# Patient Record
Sex: MALE | Race: WHITE | NOT HISPANIC OR LATINO
[De-identification: names, ages, dates, MRNs, and addresses within clinical notes are randomized per-mention and may not be internally consistent; named-entity substitution may affect disease eponyms.]

---

## 2022-11-13 ENCOUNTER — FORM ENCOUNTER (OUTPATIENT)
Age: 47
End: 2022-11-13

## 2022-11-14 ENCOUNTER — APPOINTMENT (OUTPATIENT)
Dept: ORTHOPEDIC SURGERY | Facility: CLINIC | Age: 47
End: 2022-11-14

## 2022-11-14 ENCOUNTER — APPOINTMENT (OUTPATIENT)
Dept: MRI IMAGING | Facility: CLINIC | Age: 47
End: 2022-11-14

## 2022-11-14 VITALS — BODY MASS INDEX: 35.21 KG/M2 | HEIGHT: 72 IN | WEIGHT: 260 LBS

## 2022-11-14 DIAGNOSIS — Z78.9 OTHER SPECIFIED HEALTH STATUS: ICD-10-CM

## 2022-11-14 PROCEDURE — 72148 MRI LUMBAR SPINE W/O DYE: CPT

## 2022-11-14 PROCEDURE — 72100 X-RAY EXAM L-S SPINE 2/3 VWS: CPT

## 2022-11-14 PROCEDURE — 99203 OFFICE O/P NEW LOW 30 MIN: CPT

## 2022-11-14 RX ORDER — METHYLPREDNISOLONE 4 MG/1
4 TABLET ORAL
Qty: 1 | Refills: 0 | Status: ACTIVE | COMMUNITY
Start: 2022-11-14 | End: 1900-01-01

## 2022-11-14 RX ORDER — TRAMADOL HYDROCHLORIDE 50 MG/1
50 TABLET, COATED ORAL 3 TIMES DAILY
Qty: 21 | Refills: 0 | Status: ACTIVE | COMMUNITY
Start: 2022-11-14 | End: 1900-01-01

## 2022-11-14 RX ORDER — CYCLOBENZAPRINE HYDROCHLORIDE 10 MG/1
10 TABLET, FILM COATED ORAL 3 TIMES DAILY
Qty: 60 | Refills: 2 | Status: ACTIVE | COMMUNITY
Start: 2022-11-14 | End: 1900-01-01

## 2022-11-15 ENCOUNTER — APPOINTMENT (OUTPATIENT)
Dept: ORTHOPEDIC SURGERY | Facility: CLINIC | Age: 47
End: 2022-11-15

## 2022-11-15 VITALS — WEIGHT: 260 LBS | HEIGHT: 72 IN | BODY MASS INDEX: 35.21 KG/M2

## 2022-11-15 PROCEDURE — 99213 OFFICE O/P EST LOW 20 MIN: CPT

## 2022-11-15 NOTE — DISCUSSION/SUMMARY
[de-identified] : MRI reviewed, has extruded disc impinging on nerve root on right. Will continue MDP, and see Dr. Koch next week, may need lumbar discectomy or LESIs

## 2022-11-15 NOTE — PHYSICAL EXAM
[Right lower extremity below knee] : right lower extremity below knee [Absent] : patella reflex absent [Straightening consistent with spasm] : Straightening consistent with spasm [Disc space narrowing] : Disc space narrowing [Right] : right hip with pelvis [AP] : anteroposterior [Lateral] : lateral [There are no fractures, subluxations or dislocations. No significant abnormalities are seen] : There are no fractures, subluxations or dislocations. No significant abnormalities are seen [de-identified] : medial shin [FreeTextEntry1] : Mild narrowing L3/4 [] : uses wheelchair [FreeTextEntry9] : has full range of motion with pain

## 2022-11-15 NOTE — HISTORY OF PRESENT ILLNESS
[Lower back] : lower back [7] : 7 [Dull/Aching] : dull/aching [Radiating] : radiating [Sharp] : sharp [Household chores] : household chores [Work] : work [Sleep] : sleep [de-identified] : Started with right hip pain, was wearing a hip brace which seemed to help, then pain worsened into hip, down thigh, numb right inner shin, pain around buttock to right low back. Went to ER, given Flexeril, using Aleve for NSAID. No left sided symptoms, no bowel or bladder changes [] : no [FreeTextEntry1] : right hip  [FreeTextEntry3] : 3-4 weeks  [FreeTextEntry5] : patient believes that after limping because of his plantar fascitis, he has been feeling even worse pain in the hip  [FreeTextEntry6] : numbness  [FreeTextEntry7] : from the buttocks to the shin [de-identified] : xrays

## 2022-11-15 NOTE — HISTORY OF PRESENT ILLNESS
[7] : 7 [Dull/Aching] : dull/aching [Radiating] : radiating [Sharp] : sharp [Household chores] : household chores [Work] : work [Sleep] : sleep [de-identified] : Started with right hip pain, was wearing a hip brace which seemed to help, then pain worsened into hip, down thigh, numb right inner shin, pain around buttock to right low back. Went to ER, given Flexeril, using Aleve for NSAID. No left sided symptoms, no bowel or bladder changes [] : no [FreeTextEntry1] : right hip  [FreeTextEntry3] : 3-4 weeks  [FreeTextEntry5] : patient believes that after limping because of his plantar fascitis, he has been feeling even worse pain in the hip  [FreeTextEntry6] : numbness  [FreeTextEntry7] : from the buttocks to the shin [de-identified] : xrays

## 2022-11-15 NOTE — PHYSICAL EXAM
[Right lower extremity below knee] : right lower extremity below knee [Absent] : patella reflex absent [Straightening consistent with spasm] : Straightening consistent with spasm [Disc space narrowing] : Disc space narrowing [Right] : right hip with pelvis [AP] : anteroposterior [Lateral] : lateral [There are no fractures, subluxations or dislocations. No significant abnormalities are seen] : There are no fractures, subluxations or dislocations. No significant abnormalities are seen [] : no swelling [de-identified] : medial shin [FreeTextEntry1] : Mild narrowing L3/4 [FreeTextEntry9] : has full range of motion with pain

## 2022-11-15 NOTE — REASON FOR VISIT
[Spouse] : spouse [FreeTextEntry2] : 11/15/22- Had MRI: Impression: Mild multilevel lumbar degenerative disc disease.\par Small bulge at L2-3 without stenosis or nerve root compression.\par Extruded disc fragment originating from the L3-4 disc migrating inferiorly to the right of midline behind the \par body of L4 to the level of the pedicle with significant compression of the right L4 nerve root.\par Right foraminal disc herniation at L4-5 with mild compression of the right L4 nerve root.\par Central disc herniation at L5-S1 without stenosis or nerve root compression.

## 2022-11-23 ENCOUNTER — TRANSCRIPTION ENCOUNTER (OUTPATIENT)
Age: 47
End: 2022-11-23

## 2022-11-23 ENCOUNTER — APPOINTMENT (OUTPATIENT)
Dept: ORTHOPEDIC SURGERY | Facility: CLINIC | Age: 47
End: 2022-11-23

## 2022-11-23 VITALS — HEIGHT: 72 IN | WEIGHT: 260 LBS | BODY MASS INDEX: 35.21 KG/M2

## 2022-11-23 PROCEDURE — 99214 OFFICE O/P EST MOD 30 MIN: CPT

## 2022-11-23 RX ORDER — CYCLOBENZAPRINE HYDROCHLORIDE 5 MG/1
5 TABLET, FILM COATED ORAL 3 TIMES DAILY
Qty: 60 | Refills: 0 | Status: ACTIVE | COMMUNITY
Start: 2022-11-23 | End: 1900-01-01

## 2022-11-23 NOTE — HISTORY OF PRESENT ILLNESS
[Lower back] : lower back [7] : 7 [Dull/Aching] : dull/aching [Sharp] : sharp [Household chores] : household chores [Work] : work [Sleep] : sleep [de-identified] : 47 M with RLE radiculopathy.  Seen by Miles who prescribed MDP.  Since mDP pain has improved Numbness in tingling has improved.  Initially had numbness and tingling in right anterior thigh and calf.  Now only in calf. Has not done any PT.  No bowel o bladder symptoms.  No weakness. NO LLE symptoms.  [] : no [FreeTextEntry1] : lower back  [FreeTextEntry3] : 3-4 weeks  [FreeTextEntry5] : pt is here for pain in his lower back, he has seen , and has done MRI.  [FreeTextEntry6] : numbness  [de-identified] : MRI

## 2022-11-23 NOTE — IMAGING
[de-identified] : Spine:\par Inspection/Palpation:\par No tenderness to palpation throughout Cervical/thoracic/lumbar spine.\par No bony stepoffs, No lesions.\par \par Gait:\par Non-antalgic, able to perform bilateral toe and heel rise.  Able to perform tandem gait.  \par \par Range of Motion:\par Lumbar Spine: Flexion to 90 degrees, Extension to 30 degrees, rotation 30 degrees bilaterally, lateral flexion to 30 degrees bilaterally.\par \par Neurologic:\par \par Bilateral Lower Extremities 5/5 Iliopsoas/Quadriceps/Hamstrings/ Tibialis Anterior/ Gastrocnemius. Extensor Hallucis Longus/ Flexor Hallucis Longus except \par \par \par Sensation intact to light touch L2-S1\par \par Patellar/ Achilles reflex within normal limits.\par \par \par Negative straight leg raise\par \par No pain with IR/ER/Flexion of HIps bilaterally \par \par MRI Lumbar spine wirht Large R L4-5 HNP causing foraminal stneosis. \par

## 2022-11-29 ENCOUNTER — APPOINTMENT (OUTPATIENT)
Dept: PAIN MANAGEMENT | Facility: CLINIC | Age: 47
End: 2022-11-29

## 2022-11-29 VITALS — WEIGHT: 260 LBS | HEIGHT: 72 IN | BODY MASS INDEX: 35.21 KG/M2

## 2022-11-29 PROCEDURE — 99204 OFFICE O/P NEW MOD 45 MIN: CPT

## 2022-11-29 NOTE — DISCUSSION/SUMMARY
[de-identified] : After discussing various treatment options with the patient including but not limited to oral medications, physical therapy, exercise modalities as well as interventional spinal injections, we have decided with the following plan:\par \par - Continue Home exercises, stretching, activity modification, physical therapy, and conservative care.\par - MRI report and/or images was reviewed and discussed with the patient.\par - Recommend Right L3-4, L4-5 Transforaminal Epidural Steroid Injection under fluoroscopic guidance with image.\par - The risks, benefits and alternatives of the proposed procedure were explained in detail with the patient. The risks outlined include but are not limited to infection, bleeding, post-dural puncture headache, nerve injury, a temporary increase in pain, failure to resolve symptoms, allergic reaction, symptom recurrence, and possible elevation of blood sugar in diabetics. All questions were answered to patient's apparent satisfaction and he/she verbalized an understanding.\par - Patient is presenting with acute/sub-acute radicular pain with impairment in ADLs and functionality.  The pain has not responded to conservative care including NSAID therapy and/or physical therapy.  There is no bleeding tendency, unstable medical condition, or systemic infection.\par - Follow up in 1-2 weeks post injection for re-evaluation.\par - Will call to schedule.\par

## 2022-11-29 NOTE — PHYSICAL EXAM
[de-identified] : Constitutional; Appears well, no apparent distress\par Ability to communicate: Normal \par Respiratory: non-labored breathing\par Skin: No rash noted\par Head: Normocephalic, atraumatic\par Neck: no visible thyroid enlargement\par Eyes: Extraocular movements intact\par Neurologic: Alert and oriented x3\par Psychiatric: normal mood, affect and behavior \par \par  [] : motor exam is 5/5 throughout both lower extremities with normal tone

## 2022-11-29 NOTE — HISTORY OF PRESENT ILLNESS
[Lower back] : lower back [Radiating] : radiating [Constant] : constant [Sleep] : sleep [Lying in bed] : lying in bed [de-identified] : Initial HPI 11/29/2022:\par Pain started 2-3 months ago and is in the right hip/groin and has associated numbness down the right inner thigh and lower leg. Saw Dr. Guzman Koch who recommended ZOË and if doesn’t help L4-5 microdiscectomy. \par \par MRI L-spine 11/14/22 independently reviewed: right L4-5 HNP \par Physical Therapy: Has started PT \par Pain Medications: Completed MDP with good relief, flexeril, Mobic, Percocet from the ER \par Past Injections: none\par Spine surgery: none \par Blood thinners: none  [] : no [FreeTextEntry6] : numbness [FreeTextEntry7] : right leg

## 2023-01-04 ENCOUNTER — APPOINTMENT (OUTPATIENT)
Dept: ORTHOPEDIC SURGERY | Facility: CLINIC | Age: 48
End: 2023-01-04
Payer: COMMERCIAL

## 2023-01-04 VITALS — HEIGHT: 72 IN | WEIGHT: 260 LBS | BODY MASS INDEX: 35.21 KG/M2

## 2023-01-04 DIAGNOSIS — M79.10 MYALGIA, UNSPECIFIED SITE: ICD-10-CM

## 2023-01-04 PROCEDURE — 99213 OFFICE O/P EST LOW 20 MIN: CPT

## 2023-01-04 NOTE — ASSESSMENT
[FreeTextEntry1] : 47 M with R L4 radic and large L4-5 HNP. \par Improving with PT\par Continued PT allows for ability to continue  with conservative management\par Days without PT increase pain in disability\par  FU PRN \par

## 2023-01-04 NOTE — HISTORY OF PRESENT ILLNESS
[Lower back] : lower back [7] : 7 [Dull/Aching] : dull/aching [Sharp] : sharp [Household chores] : household chores [Work] : work [Sleep] : sleep [de-identified] : 47 M with RLE radiculopathy.  Seen by Miles who prescribed MDP.  Since MDP pain has improved Numbness in tingling has improved.  Initially had numbness and tingling in right anterior thigh and calf.  Now only in calf. Has not done any PT.  No bowel o bladder symptoms.  No weakness. NO LLE symptoms. \par \par 1/4/23: patient states PT and medication have been giving him great improvement. reports numbness in right leg and mild right hip pain. Pain is improved with PT when it comes up.  Has been able to avoid ZOË due to PT. When not goign to PT for several days has increased pain.    Has been back at work since 12/12/22.  Still has persistent numbness in leg.   [] : no [FreeTextEntry1] : lower back  [FreeTextEntry3] : 3-4 weeks  [FreeTextEntry5] : pt is here for pain in his lower back, he has seen , and has done MRI.  [FreeTextEntry6] : numbness  [de-identified] : MRI

## 2023-01-04 NOTE — IMAGING
[de-identified] : Spine:\par Inspection/Palpation:\par No tenderness to palpation throughout Cervical/thoracic/lumbar spine.\par No bony stepoffs, No lesions.\par \par Gait:\par , able to perform bilateral toe and heel rise.  Able to perform tandem gait.  \par \par \par \par Neurologic:\par \par Bilateral Lower Extremities 5/5 Iliopsoas/Quadriceps/Hamstrings/ Tibialis Anterior/ Gastrocnemius. Extensor Hallucis Longus/ Flexor Hallucis Longus except \par \par \par Sensation intact to light touch L2-S1\par \par Patellar/ Achilles reflex within normal limits.\par \par \par Negative straight leg raise\par \par No pain with IR/ER/Flexion of HIps bilaterally \par \par MRI Lumbar spine wirht Large R L4-5 HNP causing foraminal stneosis. \par

## 2023-01-20 ENCOUNTER — RX RENEWAL (OUTPATIENT)
Age: 48
End: 2023-01-20

## 2023-03-01 ENCOUNTER — APPOINTMENT (OUTPATIENT)
Dept: ORTHOPEDIC SURGERY | Facility: CLINIC | Age: 48
End: 2023-03-01
Payer: COMMERCIAL

## 2023-03-01 VITALS — HEIGHT: 72 IN | BODY MASS INDEX: 36.57 KG/M2 | WEIGHT: 270 LBS

## 2023-03-01 PROCEDURE — 99213 OFFICE O/P EST LOW 20 MIN: CPT

## 2023-03-01 NOTE — IMAGING
[de-identified] : Spine:\par Inspection/Palpation:\par No tenderness to palpation throughout Cervical/thoracic/lumbar spine.\par No bony stepoffs, No lesions.\par \par Gait:\par , able to perform bilateral toe and heel rise.  Able to perform tandem gait.  \par \par \par \par Neurologic:\par \par Bilateral Lower Extremities 5/5 Iliopsoas/Quadriceps/Hamstrings/ Tibialis Anterior/ Gastrocnemius. Extensor Hallucis Longus/ Flexor Hallucis Longus except \par \par \par Sensation intact to light touch L2-S1\par \par Patellar/ Achilles reflex within normal limits.\par \par \par Negative straight leg raise\par \par No pain with IR/ER/Flexion of HIps bilaterally \par \par MRI Lumbar spine wirht Large R L4-5 HNP causing foraminal stneosis. \par

## 2023-03-01 NOTE — ASSESSMENT
[FreeTextEntry1] : 47 M with R L4 radic and large L4-5 HNP. \par Plateaued with PT\par COnsider ZOË with pain management.FU 6 weeks\par  \par If injection fails consider R l4-5 microdisc

## 2023-03-01 NOTE — HISTORY OF PRESENT ILLNESS
[Lower back] : lower back [1] : 2 [0] : 0 [Dull/Aching] : dull/aching [Sharp] : sharp [Household chores] : household chores [Work] : work [Sleep] : sleep [Full time] : Work status: full time [de-identified] : 47 M with RLE radiculopathy.  Seen by Miles who prescribed MDP.  Since MDP pain has improved Numbness in tingling has improved.  Initially had numbness and tingling in right anterior thigh and calf.  Now only in calf. Has not done any PT.  No bowel o bladder symptoms.  No weakness. NO LLE symptoms. \par \par 1/4/23: patient states PT and medication have been giving him great improvement. reports numbness in right leg and mild right hip pain. Pain is improved with PT when it comes up.  Has been able to avoid ZOË due to PT. When not goign to PT for several days has increased pain.    Has been back at work since 12/12/22.  Still has persistent numbness in leg.  \par \par 3/1/23: pt is here for follow up visit today. He reports N/T and radiating pain in bilateral legs since he was last seen.  tingling in right anterior thigh is more common.   [] : no [FreeTextEntry1] : lower back  [FreeTextEntry3] : 3-4 weeks  [FreeTextEntry5] : pt is here for pain in his lower back, he has seen , and has done MRI.  [FreeTextEntry6] : numbness  [FreeTextEntry7] : bilateral legs [de-identified] : MRI [de-identified] : [employer]-

## 2023-03-06 ENCOUNTER — APPOINTMENT (OUTPATIENT)
Dept: PAIN MANAGEMENT | Facility: CLINIC | Age: 48
End: 2023-03-06
Payer: COMMERCIAL

## 2023-03-06 VITALS — HEIGHT: 72 IN | WEIGHT: 270 LBS | BODY MASS INDEX: 36.57 KG/M2

## 2023-03-06 PROCEDURE — 99214 OFFICE O/P EST MOD 30 MIN: CPT

## 2023-03-06 NOTE — PHYSICAL EXAM
[de-identified] : Constitutional; Appears well, no apparent distress\par Ability to communicate: Normal \par Respiratory: non-labored breathing\par Skin: No rash noted\par Head: Normocephalic, atraumatic\par Neck: no visible thyroid enlargement\par Eyes: Extraocular movements intact\par Neurologic: Alert and oriented x3\par Psychiatric: normal mood, affect and behavior \par \par  [] : no lumbar paraspinal tenderness

## 2023-03-06 NOTE — HISTORY OF PRESENT ILLNESS
[Lower back] : lower back [Radiating] : radiating [Constant] : constant [Sleep] : sleep [Standing] : standing [FreeTextEntry1] : 3/6/23: Was recommended to have Right L3-4, L4-5 TFESI but wanted to wait. Has been doing PT. Has numbness down the right lateral thigh and lower leg to the ankle. Inner thigh numbness is improved. \par \par Initial HPI 11/29/2022:\par Pain started 2-3 months ago and is in the right hip/groin and has associated numbness down the right inner thigh and lower leg. Saw Dr. Guzman Koch who recommended ZOË and if doesn’t help L4-5 microdiscectomy. \par \par MRI L-spine 11/14/22 independently reviewed: right L4-5 HNP \par Physical Therapy: Has started PT \par Pain Medications: Completed MDP with good relief, flexeril, Mobic, Percocet from the ER \par Past Injections: none\par Spine surgery: none \par Blood thinners: none  [] : no [FreeTextEntry6] : numbness [FreeTextEntry7] : right leg to the foot. smetimes left leg  [de-identified] : Initial HPI 11/29/2022:\par Pain started 2-3 months ago and is in the right hip/groin and has associated numbness down the right inner thigh and lower leg. Saw Dr. Guzman Koch who recommended ZOË and if doesn’t help L4-5 microdiscectomy. \par \par MRI L-spine 11/14/22 independently reviewed: right L4-5 HNP \par Physical Therapy: Has started PT \par Pain Medications: Completed MDP with good relief, flexeril, Mobic, Percocet from the ER \par Past Injections: none\par Spine surgery: none \par Blood thinners: none

## 2023-03-06 NOTE — DISCUSSION/SUMMARY
[de-identified] : After discussing various treatment options with the patient including but not limited to oral medications, physical therapy, exercise modalities as well as interventional spinal injections, we have decided with the following plan:\par \par - Continue Home exercises, stretching, activity modification, physical therapy, and conservative care.\par - MRI report and/or images was reviewed and discussed with the patient.\par - Recommend Right L3-4, L4-5 Transforaminal Epidural Steroid Injection under fluoroscopic guidance with image.\par - The risks, benefits and alternatives of the proposed procedure were explained in detail with the patient. The risks outlined include but are not limited to infection, bleeding, post-dural puncture headache, nerve injury, a temporary increase in pain, failure to resolve symptoms, allergic reaction, symptom recurrence, and possible elevation of blood sugar in diabetics. All questions were answered to patient's apparent satisfaction and he/she verbalized an understanding.\par - Patient is presenting with acute/sub-acute radicular pain with impairment in ADLs and functionality.  The pain has not responded to conservative care including NSAID therapy and/or physical therapy.  There is no bleeding tendency, unstable medical condition, or systemic infection.\par - Follow up in 1-2 weeks post injection for re-evaluation.

## 2023-03-15 ENCOUNTER — APPOINTMENT (OUTPATIENT)
Age: 48
End: 2023-03-15
Payer: COMMERCIAL

## 2023-03-15 PROCEDURE — 64483 NJX AA&/STRD TFRM EPI L/S 1: CPT | Mod: RT

## 2023-03-15 PROCEDURE — 64484 NJX AA&/STRD TFRM EPI L/S EA: CPT | Mod: 59,RT

## 2023-03-27 ENCOUNTER — APPOINTMENT (OUTPATIENT)
Dept: PAIN MANAGEMENT | Facility: CLINIC | Age: 48
End: 2023-03-27
Payer: COMMERCIAL

## 2023-03-27 VITALS — BODY MASS INDEX: 35.21 KG/M2 | WEIGHT: 260 LBS | HEIGHT: 72 IN

## 2023-03-27 PROCEDURE — 99214 OFFICE O/P EST MOD 30 MIN: CPT

## 2023-03-27 NOTE — DISCUSSION/SUMMARY
[de-identified] : After discussing various treatment options with the patient including but not limited to oral medications, physical therapy, exercise modalities as well as interventional spinal injections, we have decided with the following plan:\par \par - Continue Home exercises, stretching, activity modification, physical therapy, and conservative care.\par - MRI report and/or images was reviewed and discussed with the patient.\par - Recommend L3-4 Lumbar Epidural Steroid Injection under fluoroscopic guidance with image. (RIGHT) \par - The risks, benefits and alternatives of the proposed procedure were explained in detail with the patient. The risks outlined include but are not limited to infection, bleeding, post-dural puncture headache, nerve injury, a temporary increase in pain, failure to resolve symptoms, allergic reaction, symptom recurrence, and possible elevation of blood sugar in diabetics. All questions were answered to patient's apparent satisfaction and he/she verbalized an understanding.\par - Patient is presenting with acute/sub-acute radicular pain with impairment in ADLs and functionality.  The pain has not responded to conservative care including NSAID therapy and/or physical therapy.  There is no bleeding tendency, unstable medical condition, or systemic infection.\par - Follow up in 1-2 weeks post injection for re-evaluation.\par

## 2023-03-27 NOTE — HISTORY OF PRESENT ILLNESS
[Lower back] : lower back [Radiating] : radiating [Constant] : constant [Sleep] : sleep [Standing] : standing [de-identified] : Initial HPI 11/29/2022:\par Pain started 2-3 months ago and is in the right hip/groin and has associated numbness down the right inner thigh and lower leg. Saw Dr. Guzman Koch who recommended ZOË and if doesn’t help L4-5 microdiscectomy. \par \par MRI L-spine 11/14/22 independently reviewed: right L4-5 HNP \par Physical Therapy: Has started PT \par Pain Medications: Completed MDP with good relief, flexeril, Mobic, Percocet from the ER \par Past Injections: none\par Spine surgery: none \par Blood thinners: none  [FreeTextEntry1] : 03/27/2023: s/p right L3-4 L4-5 TFESI on  03/15/23 with little relief. Pain no longer radiates down the leg to the ankle but still radiates to the knee. \par \par 3/6/23: Was recommended to have Right L3-4, L4-5 TFESI but wanted to wait. Has been doing PT. Has numbness down the right lateral thigh and lower leg to the ankle. Inner thigh numbness is improved. \par \par Initial HPI 11/29/2022:\par Pain started 2-3 months ago and is in the right hip/groin and has associated numbness down the right inner thigh and lower leg. Saw Dr. Guzman Koch who recommended ZOË and if doesn’t help L4-5 microdiscectomy. \par \par MRI L-spine 11/14/22 independently reviewed: right L4-5 HNP \par Physical Therapy: Has started PT \par Pain Medications: Completed MDP with good relief, flexeril, Mobic, Percocet from the ER \par Past Injections: none\par Spine surgery: none \par Blood thinners: none  [] : no [FreeTextEntry6] : numbness [FreeTextEntry7] : right leg to the foot. smetimes left leg  [TWNoteComboBox1] : 50%

## 2023-03-27 NOTE — PHYSICAL EXAM
[de-identified] : Constitutional; Appears well, no apparent distress\par Ability to communicate: Normal \par Respiratory: non-labored breathing\par Skin: No rash noted\par Head: Normocephalic, atraumatic\par Neck: no visible thyroid enlargement\par Eyes: Extraocular movements intact\par Neurologic: Alert and oriented x3\par Psychiatric: normal mood, affect and behavior \par \par  [] : no lumbar paraspinal tenderness

## 2023-04-05 ENCOUNTER — APPOINTMENT (OUTPATIENT)
Dept: ORTHOPEDIC SURGERY | Facility: CLINIC | Age: 48
End: 2023-04-05
Payer: COMMERCIAL

## 2023-04-05 VITALS — HEIGHT: 72 IN | BODY MASS INDEX: 35.21 KG/M2 | WEIGHT: 260 LBS

## 2023-04-05 DIAGNOSIS — M54.16 RADICULOPATHY, LUMBAR REGION: ICD-10-CM

## 2023-04-05 DIAGNOSIS — M51.26 OTHER INTERVERTEBRAL DISC DISPLACEMENT, LUMBAR REGION: ICD-10-CM

## 2023-04-05 PROCEDURE — 99213 OFFICE O/P EST LOW 20 MIN: CPT

## 2023-04-05 RX ORDER — MELOXICAM 15 MG/1
15 TABLET ORAL
Qty: 30 | Refills: 0 | Status: ACTIVE | COMMUNITY
Start: 2022-11-23 | End: 1900-01-01

## 2023-04-05 NOTE — IMAGING
[de-identified] : Spine:\par Inspection/Palpation:\par No tenderness to palpation throughout Cervical/thoracic/lumbar spine.\par No bony stepoffs, No lesions.\par \par Gait:\par , able to perform bilateral toe and heel rise.  Able to perform tandem gait.  \par \par \par \par Neurologic:\par \par Bilateral Lower Extremities 5/5 Iliopsoas/Quadriceps/Hamstrings/ Tibialis Anterior/ Gastrocnemius. Extensor Hallucis Longus/ Flexor Hallucis Longus except \par \par \par Sensation intact to light touch L2-S1\par \par Patellar/ Achilles reflex within normal limits.\par \par \par Negative straight leg raise\par \par No pain with IR/ER/Flexion of HIps bilaterally \par \par MRI Lumbar spine wirht Large R L4-5 HNP causing foraminal stneosis. \par

## 2023-04-05 NOTE — ASSESSMENT
[FreeTextEntry1] : 47 M with R L4 radic and large L4-5 HNP. Improved after ZOË \par Continue with PT\par Continue with ZOË\par BEgin to wean meloxicam\par FU 2 months

## 2023-04-05 NOTE — HISTORY OF PRESENT ILLNESS
[Lower back] : lower back [3] : 3 [0] : 0 [Dull/Aching] : dull/aching [Sharp] : sharp [Household chores] : household chores [Work] : work [Sleep] : sleep [Full time] : Work status: full time [de-identified] : 47 M with RLE radiculopathy.  Seen by Miles who prescribed MDP.  Since MDP pain has improved Numbness in tingling has improved.  Initially had numbness and tingling in right anterior thigh and calf.  Now only in calf. Has not done any PT.  No bowel o bladder symptoms.  No weakness. NO LLE symptoms. \par \par 1/4/23: patient states PT and medication have been giving him great improvement. reports numbness in right leg and mild right hip pain. Pain is improved with PT when it comes up.  Has been able to avoid ZOË due to PT. When not goign to PT for several days has increased pain.    Has been back at work since 12/12/22.  Still has persistent numbness in leg.  \par \par 3/1/23: pt is here for follow up visit today. He reports N/T and radiating pain in bilateral legs since he was last seen.  tingling in right anterior thigh is more common.  \par \par 4/5/23: pt is here for follow up visit. had LTFESI on 3/15/23. reports numbness has subsided since injection. PT 2x/wk with relief, has not been since injection. would like to discuss meloxicam refill.  [] : no [FreeTextEntry6] : numbness  [FreeTextEntry7] : bilateral legs [de-identified] : MRI [de-identified] : LTFESI [de-identified] : [employer]-

## 2023-05-03 ENCOUNTER — APPOINTMENT (OUTPATIENT)
Age: 48
End: 2023-05-03

## 2023-05-17 ENCOUNTER — APPOINTMENT (OUTPATIENT)
Age: 48
End: 2023-05-17
Payer: COMMERCIAL

## 2023-05-17 PROCEDURE — 62323 NJX INTERLAMINAR LMBR/SAC: CPT

## 2023-05-31 ENCOUNTER — APPOINTMENT (OUTPATIENT)
Dept: ORTHOPEDIC SURGERY | Facility: CLINIC | Age: 48
End: 2023-05-31

## 2023-06-12 ENCOUNTER — APPOINTMENT (OUTPATIENT)
Dept: PAIN MANAGEMENT | Facility: CLINIC | Age: 48
End: 2023-06-12

## 2023-06-23 ENCOUNTER — APPOINTMENT (OUTPATIENT)
Dept: PAIN MANAGEMENT | Facility: CLINIC | Age: 48
End: 2023-06-23
Payer: COMMERCIAL

## 2023-06-23 VITALS — WEIGHT: 260 LBS | BODY MASS INDEX: 35.21 KG/M2 | HEIGHT: 72 IN

## 2023-06-23 DIAGNOSIS — M54.17 RADICULOPATHY, LUMBOSACRAL REGION: ICD-10-CM

## 2023-06-23 DIAGNOSIS — M54.50 LOW BACK PAIN, UNSPECIFIED: ICD-10-CM

## 2023-06-23 PROCEDURE — 99214 OFFICE O/P EST MOD 30 MIN: CPT

## 2023-06-23 NOTE — HISTORY OF PRESENT ILLNESS
[Lower back] : lower back [Radiating] : radiating [Sleep] : sleep [Standing] : standing [0] : 0 [Occasional] : occasional [FreeTextEntry1] : 06/23/2023: s/p L3-4 LESI  on 05/17/23 with >90% relief and improvement of ADLs. Has been doing great since injection. \par \par 03/27/2023: s/p right L3-4 L4-5 TFESI on  03/15/23 with little relief. Pain no longer radiates down the leg to the ankle but still radiates to the knee. \par \par 3/6/23: Was recommended to have Right L3-4, L4-5 TFESI but wanted to wait. Has been doing PT. Has numbness down the right lateral thigh and lower leg to the ankle. Inner thigh numbness is improved. \par \par Initial HPI 11/29/2022:\par Pain started 2-3 months ago and is in the right hip/groin and has associated numbness down the right inner thigh and lower leg. Saw Dr. Guzman Koch who recommended ZOË and if doesn’t help L4-5 microdiscectomy. \par \par MRI L-spine 11/14/22 independently reviewed: right L4-5 HNP \par Physical Therapy: Has started PT \par Pain Medications: Completed MDP with good relief, flexeril, Mobic, Percocet from the ER \par Past Injections: none\par Spine surgery: none \par Blood thinners: none  [] : no [FreeTextEntry6] : numbness [FreeTextEntry7] : right leg to the foot. sometimes left leg  [TWNoteComboBox1] : 90%

## 2023-06-23 NOTE — PHYSICAL EXAM
[de-identified] : Constitutional; Appears well, no apparent distress\par Ability to communicate: Normal \par Respiratory: non-labored breathing\par Skin: No rash noted\par Head: Normocephalic, atraumatic\par Neck: no visible thyroid enlargement\par Eyes: Extraocular movements intact\par Neurologic: Alert and oriented x3\par Psychiatric: normal mood, affect and behavior \par \par  [] : no lumbar paraspinal tenderness

## 2023-06-23 NOTE — DISCUSSION/SUMMARY
[de-identified] : After discussing various treatment options with the patient including but not limited to oral medications, physical therapy, exercise modalities as well as interventional spinal injections, we have decided with the following plan:\par \par - Continue Home exercises, stretching, activity modification, physical therapy, and conservative care.\par - MRI report and/or images was reviewed and discussed with the patient.\par - Recommend L3-4 Lumbar Epidural Steroid Injection under fluoroscopic guidance with image. (RIGHT) \par - The risks, benefits and alternatives of the proposed procedure were explained in detail with the patient. The risks outlined include but are not limited to infection, bleeding, post-dural puncture headache, nerve injury, a temporary increase in pain, failure to resolve symptoms, allergic reaction, symptom recurrence, and possible elevation of blood sugar in diabetics. All questions were answered to patient's apparent satisfaction and he/she verbalized an understanding.\par - Patient is presenting with acute/sub-acute radicular pain with impairment in ADLs and functionality.  The pain has not responded to conservative care including NSAID therapy and/or physical therapy.  There is no bleeding tendency, unstable medical condition, or systemic infection.\par - Follow up in 1-2 weeks post injection for re-evaluation.\par - Will call to schedule.\par